# Patient Record
Sex: MALE | Race: WHITE | NOT HISPANIC OR LATINO | Employment: FULL TIME | URBAN - METROPOLITAN AREA
[De-identification: names, ages, dates, MRNs, and addresses within clinical notes are randomized per-mention and may not be internally consistent; named-entity substitution may affect disease eponyms.]

---

## 2022-05-25 ENCOUNTER — HOSPITAL ENCOUNTER (EMERGENCY)
Facility: HOSPITAL | Age: 52
Discharge: HOME | End: 2022-05-25
Attending: FAMILY MEDICINE | Admitting: FAMILY MEDICINE
Payer: COMMERCIAL

## 2022-05-25 ENCOUNTER — APPOINTMENT (OUTPATIENT)
Dept: RADIOLOGY | Facility: HOSPITAL | Age: 52
End: 2022-05-25
Attending: STUDENT IN AN ORGANIZED HEALTH CARE EDUCATION/TRAINING PROGRAM
Payer: COMMERCIAL

## 2022-05-25 VITALS
RESPIRATION RATE: 20 BRPM | OXYGEN SATURATION: 97 % | DIASTOLIC BLOOD PRESSURE: 96 MMHG | HEIGHT: 68 IN | SYSTOLIC BLOOD PRESSURE: 139 MMHG | TEMPERATURE: 97.3 F | BODY MASS INDEX: 31.11 KG/M2 | WEIGHT: 205.25 LBS | HEART RATE: 92 BPM

## 2022-05-25 DIAGNOSIS — M70.52 PATELLAR BURSITIS OF LEFT KNEE: ICD-10-CM

## 2022-05-25 PROCEDURE — 99283 EMERGENCY DEPT VISIT LOW MDM: CPT

## 2022-05-25 PROCEDURE — 73562 X-RAY EXAM OF KNEE 3: CPT | Mod: LT

## 2022-05-25 RX ORDER — PREDNISONE 20 MG/1
40 TABLET ORAL DAILY
Qty: 8 TABLET | Refills: 0 | Status: SHIPPED | OUTPATIENT
Start: 2022-05-25 | End: 2022-05-29

## 2022-05-25 NOTE — ED PROVIDER NOTES
EMERGENCY DEPARTMENT ENCOUNTER      NAME: Javan Warner  AGE: 51 year old male  YOB: 1970  MRN: [id number]  EVALUATION DATE & TIME: No admission date for patient encounter.    PCP: No Ref-Primary, Physician    ED PROVIDER: Dangelo Mccormick M.D.    Chief Complaint   Patient presents with     Knee Pain       FINAL IMPRESSION:  1. Patellar bursitis of left knee        ED COURSE & MEDICAL DECISION MAKING:    Pertinent Labs & Imaging studies personally reviewed and interpreted by me. (See chart for details)  3:35 AM Patient seen and examined, prior records reviewed.  Differential diagnosis includes but not limited to sprain, sprain, gout, dislocation, septic arthritis, arthritis.  Patient presents with left knee pain for the last couple of days.  No known injury.  On exam, no joint effusion, no pain with passive movement of the knee, septic arthritis or crystal arthropathy unlikely.  There is tenderness and swelling along with some redness in the area of the patellar bursa with tenderness with squeezing this.  X-rays negative.  Symptoms are most consistent with bursitis, patient is discharged with steroid and instructions to take ibuprofen.  Rest and ice.  Follow-up with orthopedics if symptoms not improved with these treatments for consideration for aspiration or injection.  We discussed the plan for discharge and the patient is agreeable. Reviewed supportive cares, symptomatic treatment, outpatient follow up, and reasons to return to the Emergency Department. Patient to be discharged by ED RN.     At the conclusion of the encounter I discussed the results of all of the tests and the disposition. The questions were answered. The patient or family acknowledged understanding and was agreeable with the care plan.     PROCEDURES:   Procedures    MEDICATIONS GIVEN IN THE EMERGENCY:  Medications - No data to display    NEW PRESCRIPTIONS STARTED AT TODAY'S ER VISIT  New Prescriptions    No medications on  "file       =================================================================    HPI    Patient information was obtained from: Patient      Javan Warner is a 51 year old male with a pertinent history of HTN, alcohol dependence, who presents to this ED via walk-in for evaluation of knee pain.    Patient reports left knee pain that started two days ago. He states that [place] and notes that over the past couple days since onset of pain, he was just working through it. Patient states that yesterday, pain increased and described it as \"throbbing.\" He states that pain worsens at rest and improves with walking. Patient has not taken medications or used ice/heat yet. No prior history of this. No recent injuries. No other complaints at this time.    REVIEW OF SYSTEMS   Review of Systems   Musculoskeletal:        Positive for left knee pain   All other systems reviewed and are negative.   All other systems reviewed and negative    PAST MEDICAL HISTORY:  History reviewed. No pertinent past medical history.    PAST SURGICAL HISTORY:  History reviewed. No pertinent surgical history.    CURRENT MEDICATIONS:    No current facility-administered medications for this encounter.     No current outpatient medications on file.       ALLERGIES:  No Known Allergies    FAMILY HISTORY:  History reviewed. No pertinent family history.    SOCIAL HISTORY:   Social History     Socioeconomic History     Marital status: Single       VITALS:  BP (!) 139/96   Pulse 92   Temp 97.3  F (36.3  C) (Oral)   Resp 20   Ht 1.727 m (5' 8\")   Wt 93.1 kg (205 lb 4 oz)   SpO2 97%   BMI 31.21 kg/m      PHYSICAL EXAM:  Physical Exam  Vitals and nursing note reviewed.   Constitutional:       Appearance: Normal appearance.   HENT:      Head: Normocephalic and atraumatic.      Right Ear: External ear normal.      Left Ear: External ear normal.      Nose: Nose normal.   Eyes:      Extraocular Movements: Extraocular movements " intact.      Conjunctiva/sclera: Conjunctivae normal.      Pupils: Pupils are equal, round, and reactive to light.   Pulmonary:      Effort: Pulmonary effort is normal.   Musculoskeletal:         General: Swelling (Patellar bursa on left) and tenderness (Patellar bursa on left) present. No deformity. Normal range of motion.      Cervical back: Normal range of motion.   Neurological:      General: No focal deficit present.      Mental Status: He is alert and oriented to person, place, and time. Mental status is at baseline.   Psychiatric:         Mood and Affect: Mood normal.         Behavior: Behavior normal.         Thought Content: Thought content normal.       LAB:  All pertinent labs reviewed and interpreted.  Results for orders placed or performed during the hospital encounter of 05/25/22   XR Knee Left 3 Views    Impression    IMPRESSION: Normal joint spaces and alignment. No fracture or joint effusion.       RADIOLOGY:  Reviewed all pertinent imaging. Please see official radiology report.  XR Knee Left 3 Views   Final Result   IMPRESSION: Normal joint spaces and alignment. No fracture or joint effusion.        I, Brandy Rios, am serving as a scribe to document services personally performed by Dr. Mccormick based on my observation and the provider's statements to me. I, Dangelo Mccormick MD attest that Brandy Rios is acting in a scribe capacity, has observed my performance of the services and has documented them in accordance with my direction.    Dangelo Mccormick M.D.  Emergency Medicine  [facility] EMERGENCY DEPARTMENT  [facility] [facility] [address]  [phone]  Dept: [phone]     Dangelo Mccormick MD  05/25/22 0352

## 2022-05-25 NOTE — Clinical Note
Javan Warner was seen and treated in our emergency department on 5/25/2022.  He may return to work on 05/26/2022.       If you have any questions or concerns, please don't hesitate to call.      Dangelo Mccormick MD

## 2022-05-25 NOTE — ED TRIAGE NOTES
Pt noticed knee pain starting a few days ago, kept working though the pain. When he had a chance to slow down and rest the pain seemed to increase. Pt states there was no injury past or present to knee, he does work a job on his feet all day.      Triage Assessment     Row Name 05/25/22 0234       Triage Assessment (Adult)    Airway WDL WDL       Respiratory WDL    Respiratory WDL WDL       Skin Circulation/Temperature WDL    Skin Circulation/Temperature WDL WDL       Cardiac WDL    Cardiac WDL WDL       Peripheral/Neurovascular WDL    Peripheral Neurovascular WDL WDL       Cognitive/Neuro/Behavioral WDL    Cognitive/Neuro/Behavioral WDL WDL